# Patient Record
Sex: MALE | Race: WHITE
[De-identification: names, ages, dates, MRNs, and addresses within clinical notes are randomized per-mention and may not be internally consistent; named-entity substitution may affect disease eponyms.]

---

## 2018-07-30 ENCOUNTER — HOSPITAL ENCOUNTER (EMERGENCY)
Dept: HOSPITAL 80 - CED | Age: 68
Discharge: HOME | End: 2018-07-30
Payer: COMMERCIAL

## 2018-07-30 VITALS — DIASTOLIC BLOOD PRESSURE: 73 MMHG | SYSTOLIC BLOOD PRESSURE: 130 MMHG

## 2018-07-30 DIAGNOSIS — S05.01XA: Primary | ICD-10-CM
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
## 2018-07-30 NOTE — EDPHY
H & P


Time Seen by Provider: 07/30/18 21:16


HPI/ROS: 





This patient worse contacts and explains that mid day he was sitting at home on 

a computer when he felt like something went into his eye and he rubbed his 

right eye attempted to remove her proceed foreign body.  Since that time he has 

developed irritation 3/10 with increasing redness to the eye that persists.  

Yes slight increase in tearing to the affected eye but no purulent discharge.  

No left eye symptoms.  He looked in the mirror and did not appreciate any 

foreign body.  He was not wearing his contacts at the time and does not have 

contact in place now the usually wears them.  He is accompanied by his wife who 

drove him here for evaluation of the symptoms.





ROS:  Constitutional:  No fevers


HEENT:  No recent URI symptoms.  No lip lesions


Neuro:  No headache.  No vision change


Integumentary:  No skin rash


5 point ROS is otherwise negative.


Past Medical/Surgical History: 





Otherwise healthy


Smoking Status: Former smoker


Physical Exam: 





Physical Exam


Vital signs are normal.


General:  No acute distress


HEENT:  Atraumatic.  Oropharynx:  No lip lesions.


Eyes:  Pupils equal and react to light.  Extraocular motions are intact.  There 

is conjunctival injection to the right eye.  On slit-lamp exam after 

proparacaine and fluorescein dye there is evidence of a corneal abrasion at 6 o'

clock appears to be to 3 mm in diameter and superficial.  No dendritic lesions.

  Appreciate no foreign bodies.  Lids and lashes are normal.  Optic fundi:  

Normal bilaterally.  Visual acuity reviewed as per nurse's sheet.


Lungs:  No respiratory distress.


Cardiac:  Brisk capillary refill is intact throughout.  


Skin:  No rash or pallor.


Neuro:  Alert and oriented with no sensorimotor deficits appreciated.





Initial differential diagnosis:  Corneal abrasion, superficial corneal ulcer, 

keratoconjunctivitis


Constitutional: 


 Initial Vital Signs











Temperature (C)  36.6 C   07/30/18 21:23


 


Heart Rate  66   07/30/18 21:23


 


Respiratory Rate  18   07/30/18 21:23


 


Blood Pressure  130/73 H  07/30/18 21:23


 


O2 Sat (%)  96   07/30/18 21:23








 











O2 Delivery Mode               Room Air














Allergies/Adverse Reactions: 


 





No Known Allergies Allergy (Unverified 07/30/18 21:23)


 








Home Medications: 














 Medication  Instructions  Recorded


 


Ritalin 10mg (RX)  10/26/14


 


Zoloft 100mg (RX)  10/26/14


 


Ofloxacin 0.3% [Ocuflox 0.3% (RX)] 2 drops EACHEYE Q1 #1 btl 07/30/18














MDM/Departure





- MDM


Medications Given: 


 








Discontinued Medications





Proparacaine HCl/Fluorescein Sodium (Flucaine)  2 drops OP EDNOW ONE


   Stop: 07/30/18 21:31


   Last Admin: 07/30/18 21:30 Dose:  2 drops





ED Course/Re-evaluation: 





Patient had relief of his discomfort with the proparacaine.  I counseled 

regarding corneal abrasion instructed him not to wear his contacts for the next 

week into use Ocuflox antibiotic drops.  He feels comfortable using over-the-

counter analgesics for his mild discomfort.  He will follow up with his 

ophthalmologist for any ongoing symptoms that persist despite the treatment 

plan.  He understands need to return emergency department should he have 

worsening of symptoms despite the treatment plan.





- Depart


Disposition: Home, Routine, Self-Care


Clinical Impression: 


Corneal abrasion


Qualifiers:


 Encounter type: initial encounter Laterality: right Qualified Code(s): 

S05.01XA - Injury of conjunctiva and corneal abrasion without foreign body, 

right eye, initial encounter





Condition: Good


Instructions:  Corneal Abrasion (ED)


Additional Instructions: 


Diagnosis:  Corneal abrasion





Plan:  Tylenol and/or ibuprofen for discomfort





Ocuflox antibiotic drops for the next week





Do not wear contacts for the next week.





Follow up with the ophthalmologist if you're not improving over the next 5-7 

days.





Return for any significant worsening despite treatment plan





Prescriptions: 


Ofloxacin 0.3% [Ocuflox 0.3% (RX)] 2 drops EACHEYE Q1 #1 btl


Referrals: 


Janelle James MD [Primary Care Provider] - As per Instructions


Cathy Villaseñor MD [Non Staff Provider (MD)] - As per Instructions